# Patient Record
Sex: FEMALE | Race: WHITE | URBAN - NONMETROPOLITAN AREA
[De-identification: names, ages, dates, MRNs, and addresses within clinical notes are randomized per-mention and may not be internally consistent; named-entity substitution may affect disease eponyms.]

---

## 2023-08-09 NOTE — PROGRESS NOTES
[address]  [phone]  23  Referring: Dr. Traore Sautee [place] (PCP)    REASON FOR CONSULT/CHIEF COMPLAINT/HPI     Reason for visit/ Chief complaint  Dyspnea on Exertion   HPI Grey Encinas is a 75 y.o. female who was referred to us by Dr. Tavon Paul for cardiac evaluation of SOB. Recent ECHO and stress tests stable. She is on no cardiac or blood pressure medications. She has had initial evaluation with sleep physician to rule out sleep apnea, but they weren't able to schedule a PSG until November. Her brother has hypertension from a young age; he's had an MI, has AF. Her mother had HTN,  of a PE age 61. Her father had angina,  in his sleep age 68. Her son has atrial fib, has had brain surgery s/p mini strokes, and also has leukemia. Today, she reports she feels fairly well. Most of her symptoms appear to be related to sleeping, where her  notes she often 'gasps for air.'  She has DEXTER with some activities but not worsening. She has occasional times of brief fluttering in chest, not limiting. She denies PND, palpitations, light-headedness, edema. She is active. She and her  will be spending the next two months in the [facility] ER. Patient is adherent with medications and is tolerating them well without side effects. HISTORY/ALLERGIES/ROS     MedHx:  has no past medical history on file. SurgHx:  has a past surgical history that includes Hysterectomy (); Colon surgery (); Finger surgery (); Gallbladder surgery; and Eye surgery (Left). SocHx:  reports that she has never smoked. She has never been exposed to tobacco smoke. She has never used smokeless tobacco. She reports current alcohol use of about 1.0 standard drink per week. She reports that she does not use drugs.    FamHx: No family history of premature coronary artery disease, sudden death, or aneurysm  Family History   Problem Relation Age of Onset    Hypertension Mother

## 2023-08-18 ENCOUNTER — OFFICE VISIT (OUTPATIENT)
Dept: CARDIOLOGY CLINIC | Age: 73
End: 2023-08-18

## 2023-08-18 VITALS
DIASTOLIC BLOOD PRESSURE: 86 MMHG | OXYGEN SATURATION: 98 % | BODY MASS INDEX: 23.6 KG/M2 | SYSTOLIC BLOOD PRESSURE: 142 MMHG | HEIGHT: 60 IN | HEART RATE: 72 BPM | WEIGHT: 120.2 LBS

## 2023-08-18 DIAGNOSIS — I36.1 NONRHEUMATIC TRICUSPID VALVE REGURGITATION: ICD-10-CM

## 2023-08-18 DIAGNOSIS — R42 LIGHT HEADED: ICD-10-CM

## 2023-08-18 DIAGNOSIS — I34.0 NONRHEUMATIC MITRAL VALVE REGURGITATION: ICD-10-CM

## 2023-08-18 DIAGNOSIS — R06.02 SOB (SHORTNESS OF BREATH): ICD-10-CM

## 2023-08-18 DIAGNOSIS — Z86.69 HISTORY OF SLEEP DISTURBANCE: ICD-10-CM

## 2023-08-18 DIAGNOSIS — R06.09 DOE (DYSPNEA ON EXERTION): Primary | ICD-10-CM

## 2023-08-18 RX ORDER — ALBUTEROL SULFATE 90 UG/1
AEROSOL, METERED RESPIRATORY (INHALATION)
COMMUNITY
Start: 2023-04-19

## 2023-08-18 RX ORDER — OMEPRAZOLE 20 MG/1
20 CAPSULE, DELAYED RELEASE ORAL
COMMUNITY
Start: 2018-09-05

## 2023-08-18 RX ORDER — MAGNESIUM GLUCONATE 27 MG(500)
500 TABLET ORAL
COMMUNITY

## 2023-08-18 RX ORDER — ESTRADIOL 0.1 MG/G
1 CREAM VAGINAL DAILY
COMMUNITY
Start: 2018-09-05

## 2023-08-18 RX ORDER — GLUCOSAMINE SULFATE 500 MG
500 CAPSULE ORAL 2 TIMES DAILY
COMMUNITY

## 2024-02-07 PROBLEM — G47.33 OBSTRUCTIVE SLEEP APNEA: Status: ACTIVE | Noted: 2024-02-07

## 2024-02-07 PROBLEM — I34.0 NONRHEUMATIC MITRAL VALVE REGURGITATION: Status: ACTIVE | Noted: 2024-02-07

## 2024-02-07 PROBLEM — I36.1 NONRHEUMATIC TRICUSPID VALVE REGURGITATION: Status: ACTIVE | Noted: 2024-02-07

## 2024-02-07 NOTE — PROGRESS NOTES
[facility]      CARDIAC FOLLOW UP  [phone]  2/15/24  Referring: Dontrell Andre (PCP)    REASON FOR CONSULT/CHIEF COMPLAINT/HPI     Reason for visit/ Chief complaint  Dyspnea on Exertion   HPI Deysi Elmore is a 73 y.o. female who was referred to us by Dr. Quiñones for cardiac evaluation of SOB. Recent ECHO and stress tests stable. She is on no cardiac or blood pressure medications.    At last visit, I reassured patient that her heart testing was all normal, except for mild valve disease, and that her symptoms were most likely due to PRESLEY.    Deysi completed her PSG on 11/13/2023 which showed mild obstructive sleep apnea. She was offered a CPAP at her follow up appointment but declined because she did not want to do another study and opted to starting on a PAP right away.      Today, Deysi states that she is doing well.  States that she tried the CPAP for 3-4 weeks and she did not like it so she does not wish to proceed with ongoing treatment.  Patient denies exertional chest pain, DEXTER/PND, palpitations, light-headedness, edema.    Patient is adherent with medications and is tolerating them well without side effects.     HISTORY/ALLERGIES/ROS     MedHx:  has no past medical history on file.  SurgHx:  has a past surgical history that includes Hysterectomy (1999); Colon surgery (1999); Finger surgery (2013); Gallbladder surgery; and Eye surgery (Left).   SocHx:  reports that she has never smoked. She has never been exposed to tobacco smoke. She has never used smokeless tobacco. She reports current alcohol use of about 1.0 standard drink of alcohol per week. She reports that she does not use drugs.   FamHx: No family history of premature coronary artery disease, sudden death, or aneurysm  Family History   Problem Relation Age of Onset    Hypertension Mother     Angina Father     Lung Disease Father     Pacemaker Brother     Heart Attack Brother     Atrial Fibrillation Brother     Atrial

## 2024-02-15 ENCOUNTER — OFFICE VISIT (OUTPATIENT)
Dept: CARDIOLOGY CLINIC | Age: 74
End: 2024-02-15
Payer: MEDICARE

## 2024-02-15 VITALS
HEART RATE: 87 BPM | WEIGHT: 121.8 LBS | HEIGHT: 60 IN | SYSTOLIC BLOOD PRESSURE: 130 MMHG | BODY MASS INDEX: 23.91 KG/M2 | OXYGEN SATURATION: 99 % | DIASTOLIC BLOOD PRESSURE: 80 MMHG

## 2024-02-15 DIAGNOSIS — G47.33 OBSTRUCTIVE SLEEP APNEA: ICD-10-CM

## 2024-02-15 DIAGNOSIS — I34.0 NONRHEUMATIC MITRAL VALVE REGURGITATION: Primary | ICD-10-CM

## 2024-02-15 DIAGNOSIS — I36.1 NONRHEUMATIC TRICUSPID VALVE REGURGITATION: ICD-10-CM

## 2024-02-15 PROCEDURE — G8427 DOCREV CUR MEDS BY ELIG CLIN: HCPCS | Performed by: INTERNAL MEDICINE

## 2024-02-15 PROCEDURE — 99213 OFFICE O/P EST LOW 20 MIN: CPT | Performed by: INTERNAL MEDICINE

## 2024-02-15 PROCEDURE — 1090F PRES/ABSN URINE INCON ASSESS: CPT | Performed by: INTERNAL MEDICINE

## 2024-02-15 PROCEDURE — 3017F COLORECTAL CA SCREEN DOC REV: CPT | Performed by: INTERNAL MEDICINE

## 2024-02-15 PROCEDURE — 1036F TOBACCO NON-USER: CPT | Performed by: INTERNAL MEDICINE

## 2024-02-15 PROCEDURE — G8400 PT W/DXA NO RESULTS DOC: HCPCS | Performed by: INTERNAL MEDICINE

## 2024-02-15 PROCEDURE — G8420 CALC BMI NORM PARAMETERS: HCPCS | Performed by: INTERNAL MEDICINE

## 2024-02-15 PROCEDURE — 1123F ACP DISCUSS/DSCN MKR DOCD: CPT | Performed by: INTERNAL MEDICINE

## 2024-02-15 PROCEDURE — G8484 FLU IMMUNIZE NO ADMIN: HCPCS | Performed by: INTERNAL MEDICINE

## 2025-03-14 ENCOUNTER — OFFICE VISIT (OUTPATIENT)
Dept: CARDIOLOGY CLINIC | Age: 75
End: 2025-03-14

## 2025-03-14 VITALS
BODY MASS INDEX: 23.81 KG/M2 | WEIGHT: 121.3 LBS | DIASTOLIC BLOOD PRESSURE: 82 MMHG | OXYGEN SATURATION: 98 % | HEART RATE: 80 BPM | SYSTOLIC BLOOD PRESSURE: 136 MMHG | HEIGHT: 60 IN

## 2025-03-14 DIAGNOSIS — G47.33 OBSTRUCTIVE SLEEP APNEA: ICD-10-CM

## 2025-03-14 DIAGNOSIS — I36.1 NONRHEUMATIC TRICUSPID VALVE REGURGITATION: ICD-10-CM

## 2025-03-14 DIAGNOSIS — I34.0 NONRHEUMATIC MITRAL VALVE REGURGITATION: Primary | ICD-10-CM

## 2025-03-14 NOTE — PROGRESS NOTES
[facility]      CARDIAC FOLLOW UP  [phone]  3/14/25  Referring: Dontrell Andre MD (PCP)    REASON FOR CONSULT/CHIEF COMPLAINT/HPI     Reason for visit/ Chief complaint  Dyspnea on Exertion   HPI Deysi Elmore is a 74 y.o. female who was referred to us by Dr. Quiñones for cardiac evaluation of SOB. 7/14/23 ECHO and stress tests stable. She is on no cardiac or blood pressure medications.    At last visit, I reassured patient that her heart testing was all normal, except for mild valve disease, and that her symptoms were most likely due to PRESLEY.    Deysi completed her PSG on 11/13/2023 which showed mild obstructive sleep apnea. She was offered a CPAP at her follow up appointment but declined because she did not want to do another study. She is not using c-pap. She has not wanted Inspire, as well.     Today, Deysi is here for a 12 month follow up.  She states she has been feeling fine. Deysi denies chest pain, palpitations, DEXTER, dizziness, or edema.     Patient is adherent with medications and is tolerating them well without side effects.     HISTORY/ALLERGIES/ROS     MedHx:  has no past medical history on file.  SurgHx:  has a past surgical history that includes Hysterectomy (1999); Colon surgery (1999); Finger surgery (2013); Gallbladder surgery; and Eye surgery (Left).   SocHx:  reports that she has never smoked. She has never been exposed to tobacco smoke. She has never used smokeless tobacco. She reports current alcohol use of about 1.0 standard drink of alcohol per week. She reports that she does not use drugs.   FamHx: No family history of premature coronary artery disease, sudden death, or aneurysm  Family History   Problem Relation Age of Onset    Hypertension Mother     Angina Father     Lung Disease Father     Pacemaker Brother     Heart Attack Brother     Atrial Fibrillation Brother     Atrial Fibrillation Son      Allergies: Morphine and codeine and Naproxen     MEDICATIONS